# Patient Record
Sex: FEMALE | Race: WHITE | URBAN - METROPOLITAN AREA
[De-identification: names, ages, dates, MRNs, and addresses within clinical notes are randomized per-mention and may not be internally consistent; named-entity substitution may affect disease eponyms.]

---

## 2018-06-29 PROBLEM — I21.3 STEMI (ST ELEVATION MYOCARDIAL INFARCTION) (HCC): Status: ACTIVE | Noted: 2018-06-29

## 2018-07-27 ENCOUNTER — OFFICE VISIT (OUTPATIENT)
Dept: CARDIOLOGY CLINIC | Age: 58
End: 2018-07-27

## 2018-07-27 VITALS
OXYGEN SATURATION: 99 % | DIASTOLIC BLOOD PRESSURE: 72 MMHG | HEART RATE: 87 BPM | BODY MASS INDEX: 31.74 KG/M2 | WEIGHT: 234 LBS | SYSTOLIC BLOOD PRESSURE: 122 MMHG

## 2018-07-27 DIAGNOSIS — I25.5 ISCHEMIC CARDIOMYOPATHY: ICD-10-CM

## 2018-07-27 DIAGNOSIS — I25.10 ASCVD (ARTERIOSCLEROTIC CARDIOVASCULAR DISEASE): ICD-10-CM

## 2018-07-27 PROCEDURE — 1036F TOBACCO NON-USER: CPT | Performed by: INTERNAL MEDICINE

## 2018-07-27 PROCEDURE — G8427 DOCREV CUR MEDS BY ELIG CLIN: HCPCS | Performed by: INTERNAL MEDICINE

## 2018-07-27 PROCEDURE — 1111F DSCHRG MED/CURRENT MED MERGE: CPT | Performed by: INTERNAL MEDICINE

## 2018-07-27 PROCEDURE — 99214 OFFICE O/P EST MOD 30 MIN: CPT | Performed by: INTERNAL MEDICINE

## 2018-07-27 PROCEDURE — G8598 ASA/ANTIPLAT THER USED: HCPCS | Performed by: INTERNAL MEDICINE

## 2018-07-27 PROCEDURE — G8417 CALC BMI ABV UP PARAM F/U: HCPCS | Performed by: INTERNAL MEDICINE

## 2018-07-27 PROCEDURE — 3017F COLORECTAL CA SCREEN DOC REV: CPT | Performed by: INTERNAL MEDICINE

## 2018-07-27 RX ORDER — SPIRONOLACTONE 25 MG/1
25 TABLET ORAL DAILY
Qty: 30 TABLET | Refills: 3 | Status: SHIPPED | OUTPATIENT
Start: 2018-07-27

## 2018-07-27 RX ORDER — CARVEDILOL 3.12 MG/1
3.12 TABLET ORAL 2 TIMES DAILY WITH MEALS
Qty: 60 TABLET | Refills: 0 | Status: SHIPPED | OUTPATIENT
Start: 2018-07-27

## 2018-07-27 RX ORDER — LISINOPRIL 10 MG/1
5 TABLET ORAL DAILY
Qty: 30 TABLET | Refills: 3 | Status: SHIPPED | OUTPATIENT
Start: 2018-07-27

## 2018-07-27 NOTE — PROGRESS NOTES
1,000 mg by mouth 2 times daily       No current facility-administered medications for this visit. Allergies:   Pcn [penicillins]    Patient History:  Past Medical History:   Diagnosis Date    Cancer ([facility]     Diabetes mellitus ([facility]     Hypertension      No past surgical history on file. No family history on file. Social History   Substance Use Topics    Smoking status: Former Smoker     Packs/day: 0.50     Types: Cigarettes     Quit date: 6/29/2018    Smokeless tobacco: Never Used    Alcohol use Yes      Comment: social        Review of Systems:   · Constitutional: No Fever or Weight Loss   · Eyes: No Decreased Vision  · ENT: No Headaches, Hearing Loss or Vertigo  · Cardiovascular: as per note above   · Respiratory: No cough or wheezing and as per note above. · Gastrointestinal: No abdominal pain, appetite loss, blood in stools, constipation, diarrhea or heartburn  · Genitourinary: No dysuria, trouble voiding, or hematuria  · Musculoskeletal:  None  · Integumentary: No rash or pruritis  · Neurological: No TIA or stroke symptoms  · Psychiatric: No anxiety or depression  · Endocrine: No malaise, fatigue or temperature intolerance  · Hematologic/Lymphatic: No bleeding problems, blood clots or swollen lymph nodes  · Allergic/Immunologic: No nasal congestion or hives    Objective:      Physical Exam:  /72 (Site: Right Arm, Position: Sitting, Cuff Size: Large Adult)   Pulse 87   Wt 234 lb (106.1 kg)   SpO2 99%   BMI 31.74 kg/m²   Wt Readings from Last 3 Encounters:   07/27/18 234 lb (106.1 kg)   07/01/18 243 lb 12.8 oz (110.6 kg)     Body mass index is 31.74 kg/m². Vitals:    07/27/18 0851   BP: 122/72   Pulse: 87   SpO2: 99%        General Appearance:  No distress, conversant  Constitutional:  Well developed, Well nourished, No acute distress, Non-toxic appearance.    HENT:  Normocephalic, Atraumatic, Bilateral external ears normal, Oropharynx moist, No oral exudates, Nose normal. Neck- cardiomyopathy seems to be out of proportion to LAD disease and circumflex disease. However, it may be worthwhile intervening on either circumflex alone or circumflex and LAD to help improve LV function. She will follow-up at [facility]. I will discuss with her cardiologist at [place]. If I can be of any assistance. She should get the evaluated for possible cardiac rehab. Denies chest pain but she is short of breath    Ischemic cardiomyopathy  Echo shows EF of 25-30%. Continue Coreg increased lisinopril  to 10 mg daily and add Aldactone 25 mg daily. 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 Her cardiomyopathy is potentially out of proportion to CAD. Continue aggressive medical therapy and reevaluate EF by Sept 2018. To debate BIV ICD versus ICD If EF does not improve. Although her cardiomyopathy could be out of proportion to CAD. It may be worthwhile to revascularize LAD and circumflex to help her chances of improving EF. I explained the pros and cons extensively. I've encouraged her to follow-up with her cardiologist at [place] and debate further workup including possible revascularization of LAD and circumflex     Dyslipidemia :  Letha Flores had lab work recently,  Lipid profile was reviewed with patient. Continue lipitor 40 mg daily       I've encouraged to avoid strenuous activity for now. Uptitrate medication as needed    Continue all other medications of all above medical condition listed as is. Return if symptoms worsen or fail to improve. Please note this report has been partially produced using speech recognition software and may contain errors related to that system including errors in grammar, punctuation, and spelling, as well as words and phrases that may be inappropriate.  If there are any questions or concerns please feel free to contact the dictating provider for clarification.

## 2018-07-27 NOTE — ASSESSMENT & PLAN NOTE
S/p STEMI  Ostial RCA on 6/29/18. She has moderate disease involving Circ and LAD , ( circ 80% , LAD mid 60 to 70 % ) , she has no angina , continue DAPT for  1 year . Her cardiomyopathy seems to be out of proportion to LAD disease and circumflex disease. However, it may be worthwhile intervening on either circumflex alone or circumflex and LAD to help improve LV function. She will follow-up at [facility]. I will discuss with her cardiologist at [facility]. If I can be of any assistance. She should get the evaluated for possible cardiac rehab.   Denies chest pain but she is short of breath

## 2018-07-30 ENCOUNTER — TELEPHONE (OUTPATIENT)
Dept: CARDIOLOGY CLINIC | Age: 58
End: 2018-07-30

## 2018-07-30 NOTE — TELEPHONE ENCOUNTER
Faxed to [address] [place] [address]. to [phone] and also mailed to the pt home-7/30/18.

## 2022-01-25 ENCOUNTER — HOSPITAL ENCOUNTER (OUTPATIENT)
Age: 62
Setting detail: SPECIMEN
Discharge: HOME | End: 2022-01-25
Payer: COMMERCIAL

## 2022-01-25 LAB
ANION GAP SERPL CALCULATED.3IONS-SCNC: 14 MMOL/L (ref 4–16)
BUN BLDV-MCNC: 32 MG/DL (ref 6–23)
CALCIUM SERPL-MCNC: 9.1 MG/DL (ref 8.3–10.6)
CHLORIDE BLD-SCNC: 101 MMOL/L (ref 99–110)
CO2: 24 MMOL/L (ref 21–32)
CREAT SERPL-MCNC: 1.5 MG/DL (ref 0.6–1.1)
GFR AFRICAN AMERICAN: 43 ML/MIN/1.73M2
GFR NON-AFRICAN AMERICAN: 35 ML/MIN/1.73M2
GLUCOSE BLD-MCNC: 116 MG/DL (ref 70–99)
POTASSIUM SERPL-SCNC: 4.5 MMOL/L (ref 3.5–5.1)
SODIUM BLD-SCNC: 139 MMOL/L (ref 135–145)

## 2022-01-25 PROCEDURE — 80048 BASIC METABOLIC PNL TOTAL CA: CPT

## 2022-01-25 PROCEDURE — 82550 ASSAY OF CK (CPK): CPT

## 2022-01-25 PROCEDURE — 84155 ASSAY OF PROTEIN SERUM: CPT

## 2022-01-25 PROCEDURE — 84450 TRANSFERASE (AST) (SGOT): CPT

## 2022-01-25 PROCEDURE — 80197 ASSAY OF TACROLIMUS: CPT

## 2022-01-25 PROCEDURE — 84075 ASSAY ALKALINE PHOSPHATASE: CPT

## 2022-01-25 PROCEDURE — 82040 ASSAY OF SERUM ALBUMIN: CPT

## 2022-01-25 PROCEDURE — 84460 ALANINE AMINO (ALT) (SGPT): CPT

## 2022-01-25 PROCEDURE — 82247 BILIRUBIN TOTAL: CPT

## 2022-01-27 LAB
ALBUMIN SERPL-MCNC: 3.5 GM/DL (ref 3.4–5)
ALP BLD-CCNC: 67 IU/L (ref 40–129)
ALT SERPL-CCNC: 16 U/L (ref 10–40)
AST SERPL-CCNC: 23 IU/L (ref 15–37)
BILIRUB SERPL-MCNC: 0.2 MG/DL (ref 0–1)
TACROLIMUS BLOOD: 3.9 NG/ML
TOTAL CK: 34 IU/L (ref 26–140)
TOTAL PROTEIN: 6.1 GM/DL (ref 6.4–8.2)